# Patient Record
Sex: FEMALE | Race: WHITE | NOT HISPANIC OR LATINO | Employment: UNEMPLOYED | URBAN - METROPOLITAN AREA
[De-identification: names, ages, dates, MRNs, and addresses within clinical notes are randomized per-mention and may not be internally consistent; named-entity substitution may affect disease eponyms.]

---

## 2019-01-28 LAB
HIV 1&2 EXT: NORMAL
HPV ABSTRACT: NORMAL
PAP-ABSTRACT: NORMAL

## 2021-01-05 ENCOUNTER — TRANSFERRED RECORDS (OUTPATIENT)
Dept: MULTI SPECIALTY CLINIC | Facility: CLINIC | Age: 31
End: 2021-01-05

## 2021-01-05 LAB — TSH SERPL-ACNC: 4.55 UIU/ML (ref 0.4–3.99)

## 2021-11-01 ENCOUNTER — OFFICE VISIT (OUTPATIENT)
Dept: FAMILY MEDICINE | Facility: CLINIC | Age: 31
End: 2021-11-01
Payer: COMMERCIAL

## 2021-11-01 VITALS
WEIGHT: 159 LBS | HEIGHT: 66 IN | TEMPERATURE: 98.4 F | OXYGEN SATURATION: 98 % | HEART RATE: 64 BPM | RESPIRATION RATE: 20 BRPM | SYSTOLIC BLOOD PRESSURE: 100 MMHG | DIASTOLIC BLOOD PRESSURE: 68 MMHG | BODY MASS INDEX: 25.55 KG/M2

## 2021-11-01 DIAGNOSIS — Z23 NEED FOR PROPHYLACTIC VACCINATION AND INOCULATION AGAINST INFLUENZA: ICD-10-CM

## 2021-11-01 DIAGNOSIS — E03.9 HYPOTHYROIDISM, UNSPECIFIED TYPE: Primary | ICD-10-CM

## 2021-11-01 PROCEDURE — 99203 OFFICE O/P NEW LOW 30 MIN: CPT | Mod: 25 | Performed by: NURSE PRACTITIONER

## 2021-11-01 PROCEDURE — 84443 ASSAY THYROID STIM HORMONE: CPT | Performed by: NURSE PRACTITIONER

## 2021-11-01 PROCEDURE — 90686 IIV4 VACC NO PRSV 0.5 ML IM: CPT | Performed by: NURSE PRACTITIONER

## 2021-11-01 PROCEDURE — 90471 IMMUNIZATION ADMIN: CPT | Performed by: NURSE PRACTITIONER

## 2021-11-01 PROCEDURE — 36415 COLL VENOUS BLD VENIPUNCTURE: CPT | Performed by: NURSE PRACTITIONER

## 2021-11-01 RX ORDER — LEVOTHYROXINE SODIUM 125 UG/1
62.5 TABLET ORAL
COMMUNITY
Start: 2021-10-04 | End: 2021-12-29

## 2021-11-01 ASSESSMENT — MIFFLIN-ST. JEOR: SCORE: 1435.03

## 2021-11-01 ASSESSMENT — PAIN SCALES - GENERAL: PAINLEVEL: NO PAIN (0)

## 2021-11-01 NOTE — Clinical Note
Please abstract the following data from this visit with this patient into the appropriate field in Epic:    Tests that can be patient reported without a hard copy:    Pap smear done on this date: 1/28/2019 (approximately), by this group: [facility], results were normal, HPV negative--see Care Everywhere for results.

## 2021-11-01 NOTE — PROGRESS NOTES
Assessment & Plan     Hypothyroidism, unspecified type  Lab today for surveillance.  Adjustments to dose per lab results.   - TSH with free T4 reflex; Future             No follow-ups on file.    MICHEAL Melendez CNP [facility] DARIA Silver is a 31 year old who presents for the following health issues     HPI   New patient - est. care  Hypothyroidism Follow-up      Since last visit, patient describes the following symptoms: hair loss, joint pain      How many servings of fruits and vegetables do you eat daily?  2-3    On average, how many sweetened beverages do you drink each day (Examples: soda, juice, sweet tea, etc.  Do NOT count diet or artificially sweetened beverages)?   0    How many days per week do you exercise enough to make your heart beat faster? 3 or less    How many minutes a day do you exercise enough to make your heart beat faster? 9 or less  How many days per week do you miss taking your medication? 1    What makes it hard for you to take your medications?  remembering to take  Diagnosed with hypothyroidism in 5th grade.    As a young adult was on/off medications.   For awhile levels were normal without medication.   While pregnant 2 years ago labs TSH was elevated and medications restarted.   Levels were erratic after having her daughter.   Currently doing a 1/2 of 125 mcg tablet.   Thinking about trying to get pregnant soon.       Please abstract the following data from this visit with this patient into the appropriate field in Epic:    Tests that can be patient reported without a hard copy:    Pap smear done on this date: 1/28/2019 (approximately), by this group: [facility], results were normal, HPV negative--see Care Everywhere for results.       Review of Systems   Constitutional, HEENT, cardiovascular, pulmonary, gi and gu systems are negative, except as otherwise noted.      Objective    /68 (BP Location: Right arm, Patient Position: Sitting, Cuff  "Size: Adult Regular)   Pulse 64   Temp 98.4  F (36.9  C) (Oral)   Resp 20   Ht 1.664 m (5' 5.5\")   Wt 72.1 kg (159 lb)   LMP 10/28/2021 (Exact Date)   SpO2 98%   BMI 26.06 kg/m    Body mass index is 26.06 kg/m .  Physical Exam   GENERAL: healthy, alert and no distress  EYES: Eyes grossly normal to inspection and conjunctivae and sclerae normal  NECK: no adenopathy, no asymmetry, masses, or scars and thyroid normal to palpation  MS: no gross musculoskeletal defects noted  SKIN: no suspicious lesions or rashes  PSYCH: mentation appears normal, affect normal/bright    No results found for this or any previous visit (from the past 24 hour(s)).            "

## 2021-11-02 LAB — TSH SERPL DL<=0.005 MIU/L-ACNC: 3.66 MU/L (ref 0.4–4)

## 2021-11-09 ENCOUNTER — MYC MEDICAL ADVICE (OUTPATIENT)
Dept: FAMILY MEDICINE | Facility: CLINIC | Age: 31
End: 2021-11-09
Payer: COMMERCIAL

## 2021-11-10 ENCOUNTER — MYC MEDICAL ADVICE (OUTPATIENT)
Dept: FAMILY MEDICINE | Facility: CLINIC | Age: 31
End: 2021-11-10
Payer: COMMERCIAL

## 2021-11-10 DIAGNOSIS — E03.9 HYPOTHYROIDISM, UNSPECIFIED TYPE: Primary | ICD-10-CM

## 2021-11-14 ENCOUNTER — HEALTH MAINTENANCE LETTER (OUTPATIENT)
Age: 31
End: 2021-11-14

## 2021-12-29 DIAGNOSIS — E03.9 HYPOTHYROIDISM, UNSPECIFIED TYPE: Primary | ICD-10-CM

## 2021-12-29 NOTE — TELEPHONE ENCOUNTER
Routing refill request to provider for review/approval because:  Medication is reported/historical    Judi Chaudhary RN

## 2021-12-30 RX ORDER — LEVOTHYROXINE SODIUM 125 UG/1
62.5 TABLET ORAL DAILY
Qty: 90 TABLET | Refills: 1 | Status: SHIPPED | OUTPATIENT
Start: 2021-12-30

## 2022-02-01 LAB
HEPATITIS B SURFACE ANTIGEN (EXTERNAL): NEGATIVE
HIV1+2 AB SERPL QL IA: NEGATIVE
RUBELLA ANTIBODY IGG (EXTERNAL): NORMAL

## 2022-06-13 ENCOUNTER — HOSPITAL ENCOUNTER (OUTPATIENT)
Age: 32
End: 2022-06-13
Payer: COMMERCIAL

## 2022-09-07 ENCOUNTER — HOSPITAL ENCOUNTER (INPATIENT)
Facility: CLINIC | Age: 32
LOS: 1 days | Discharge: HOME | End: 2022-09-08
Attending: OBSTETRICS & GYNECOLOGY | Admitting: OBSTETRICS & GYNECOLOGY
Payer: COMMERCIAL

## 2022-09-07 ENCOUNTER — ANESTHESIA (OUTPATIENT)
Dept: OBGYN | Facility: CLINIC | Age: 32
End: 2022-09-07
Payer: COMMERCIAL

## 2022-09-07 ENCOUNTER — ANESTHESIA EVENT (OUTPATIENT)
Dept: OBGYN | Facility: CLINIC | Age: 32
End: 2022-09-07
Payer: COMMERCIAL

## 2022-09-07 PROBLEM — Z34.90 ENCOUNTER FOR INDUCTION OF LABOR: Status: ACTIVE | Noted: 2022-09-07

## 2022-09-07 LAB
ABO/RH(D): NORMAL
ANTIBODY SCREEN: NEGATIVE
HOLD SPECIMEN: NORMAL
SARS-COV-2 RNA RESP QL NAA+PROBE: NEGATIVE
SPECIMEN EXPIRATION DATE: NORMAL

## 2022-09-07 PROCEDURE — 250N000011 HC RX IP 250 OP 636: Performed by: ANESTHESIOLOGY

## 2022-09-07 PROCEDURE — 86901 BLOOD TYPING SEROLOGIC RH(D): CPT | Performed by: OBSTETRICS & GYNECOLOGY

## 2022-09-07 PROCEDURE — 258N000003 HC RX IP 258 OP 636: Performed by: ANESTHESIOLOGY

## 2022-09-07 PROCEDURE — 10907ZC DRAINAGE OF AMNIOTIC FLUID, THERAPEUTIC FROM PRODUCTS OF CONCEPTION, VIA NATURAL OR ARTIFICIAL OPENING: ICD-10-PCS | Performed by: OBSTETRICS & GYNECOLOGY

## 2022-09-07 PROCEDURE — 250N000009 HC RX 250: Performed by: OBSTETRICS & GYNECOLOGY

## 2022-09-07 PROCEDURE — 722N000001 HC LABOR CARE VAGINAL DELIVERY SINGLE

## 2022-09-07 PROCEDURE — U0003 INFECTIOUS AGENT DETECTION BY NUCLEIC ACID (DNA OR RNA); SEVERE ACUTE RESPIRATORY SYNDROME CORONAVIRUS 2 (SARS-COV-2) (CORONAVIRUS DISEASE [COVID-19]), AMPLIFIED PROBE TECHNIQUE, MAKING USE OF HIGH THROUGHPUT TECHNOLOGIES AS DESCRIBED BY CMS-2020-01-R: HCPCS | Performed by: OBSTETRICS & GYNECOLOGY

## 2022-09-07 PROCEDURE — 86850 RBC ANTIBODY SCREEN: CPT | Performed by: OBSTETRICS & GYNECOLOGY

## 2022-09-07 PROCEDURE — 250N000011 HC RX IP 250 OP 636: Performed by: OBSTETRICS & GYNECOLOGY

## 2022-09-07 PROCEDURE — 86780 TREPONEMA PALLIDUM: CPT | Performed by: OBSTETRICS & GYNECOLOGY

## 2022-09-07 PROCEDURE — 258N000003 HC RX IP 258 OP 636: Performed by: OBSTETRICS & GYNECOLOGY

## 2022-09-07 PROCEDURE — 120N000001 HC R&B MED SURG/OB

## 2022-09-07 PROCEDURE — 250N000013 HC RX MED GY IP 250 OP 250 PS 637: Performed by: OBSTETRICS & GYNECOLOGY

## 2022-09-07 PROCEDURE — 3E0R3BZ INTRODUCTION OF ANESTHETIC AGENT INTO SPINAL CANAL, PERCUTANEOUS APPROACH: ICD-10-PCS | Performed by: ANESTHESIOLOGY

## 2022-09-07 PROCEDURE — 00HU33Z INSERTION OF INFUSION DEVICE INTO SPINAL CANAL, PERCUTANEOUS APPROACH: ICD-10-PCS | Performed by: ANESTHESIOLOGY

## 2022-09-07 PROCEDURE — 250N000009 HC RX 250: Performed by: ANESTHESIOLOGY

## 2022-09-07 PROCEDURE — 370N000003 HC ANESTHESIA WARD SERVICE

## 2022-09-07 RX ORDER — NALOXONE HYDROCHLORIDE 0.4 MG/ML
0.2 INJECTION, SOLUTION INTRAMUSCULAR; INTRAVENOUS; SUBCUTANEOUS
Status: DISCONTINUED | OUTPATIENT
Start: 2022-09-07 | End: 2022-09-07 | Stop reason: HOSPADM

## 2022-09-07 RX ORDER — METHYLERGONOVINE MALEATE 0.2 MG/ML
200 INJECTION INTRAVENOUS
Status: DISCONTINUED | OUTPATIENT
Start: 2022-09-07 | End: 2022-09-07 | Stop reason: HOSPADM

## 2022-09-07 RX ORDER — SODIUM CHLORIDE, SODIUM LACTATE, POTASSIUM CHLORIDE, CALCIUM CHLORIDE 600; 310; 30; 20 MG/100ML; MG/100ML; MG/100ML; MG/100ML
INJECTION, SOLUTION INTRAVENOUS CONTINUOUS PRN
Status: DISCONTINUED | OUTPATIENT
Start: 2022-09-07 | End: 2022-09-07 | Stop reason: HOSPADM

## 2022-09-07 RX ORDER — TERBUTALINE SULFATE 1 MG/ML
0.25 INJECTION, SOLUTION SUBCUTANEOUS
Status: DISCONTINUED | OUTPATIENT
Start: 2022-09-07 | End: 2022-09-07

## 2022-09-07 RX ORDER — NALBUPHINE HYDROCHLORIDE 10 MG/ML
2.5-5 INJECTION, SOLUTION INTRAMUSCULAR; INTRAVENOUS; SUBCUTANEOUS EVERY 6 HOURS PRN
Status: DISCONTINUED | OUTPATIENT
Start: 2022-09-07 | End: 2022-09-08 | Stop reason: HOSPADM

## 2022-09-07 RX ORDER — TRANEXAMIC ACID 10 MG/ML
1 INJECTION, SOLUTION INTRAVENOUS EVERY 30 MIN PRN
Status: DISCONTINUED | OUTPATIENT
Start: 2022-09-07 | End: 2022-09-08 | Stop reason: HOSPADM

## 2022-09-07 RX ORDER — NALOXONE HYDROCHLORIDE 0.4 MG/ML
0.4 INJECTION, SOLUTION INTRAMUSCULAR; INTRAVENOUS; SUBCUTANEOUS
Status: DISCONTINUED | OUTPATIENT
Start: 2022-09-07 | End: 2022-09-07 | Stop reason: HOSPADM

## 2022-09-07 RX ORDER — METHYLERGONOVINE MALEATE 0.2 MG/ML
200 INJECTION INTRAVENOUS
Status: DISCONTINUED | OUTPATIENT
Start: 2022-09-07 | End: 2022-09-08 | Stop reason: HOSPADM

## 2022-09-07 RX ORDER — OXYTOCIN 10 [USP'U]/ML
10 INJECTION, SOLUTION INTRAMUSCULAR; INTRAVENOUS
Status: DISCONTINUED | OUTPATIENT
Start: 2022-09-07 | End: 2022-09-08 | Stop reason: HOSPADM

## 2022-09-07 RX ORDER — ACETAMINOPHEN 325 MG/1
650 TABLET ORAL EVERY 4 HOURS PRN
Status: DISCONTINUED | OUTPATIENT
Start: 2022-09-07 | End: 2022-09-08 | Stop reason: HOSPADM

## 2022-09-07 RX ORDER — BISACODYL 10 MG
10 SUPPOSITORY, RECTAL RECTAL DAILY PRN
Status: DISCONTINUED | OUTPATIENT
Start: 2022-09-07 | End: 2022-09-08 | Stop reason: HOSPADM

## 2022-09-07 RX ORDER — OXYTOCIN/0.9 % SODIUM CHLORIDE 30/500 ML
340 PLASTIC BAG, INJECTION (ML) INTRAVENOUS CONTINUOUS PRN
Status: DISCONTINUED | OUTPATIENT
Start: 2022-09-07 | End: 2022-09-07 | Stop reason: HOSPADM

## 2022-09-07 RX ORDER — MISOPROSTOL 200 UG/1
400 TABLET ORAL
Status: DISCONTINUED | OUTPATIENT
Start: 2022-09-07 | End: 2022-09-07 | Stop reason: HOSPADM

## 2022-09-07 RX ORDER — OXYTOCIN/0.9 % SODIUM CHLORIDE 30/500 ML
1-24 PLASTIC BAG, INJECTION (ML) INTRAVENOUS CONTINUOUS
Status: DISCONTINUED | OUTPATIENT
Start: 2022-09-07 | End: 2022-09-07

## 2022-09-07 RX ORDER — CARBOPROST TROMETHAMINE 250 UG/ML
250 INJECTION, SOLUTION INTRAMUSCULAR
Status: DISCONTINUED | OUTPATIENT
Start: 2022-09-07 | End: 2022-09-08 | Stop reason: HOSPADM

## 2022-09-07 RX ORDER — LIDOCAINE HYDROCHLORIDE AND EPINEPHRINE 15; 5 MG/ML; UG/ML
INJECTION, SOLUTION EPIDURAL PRN
Status: DISCONTINUED | OUTPATIENT
Start: 2022-09-07 | End: 2022-09-07

## 2022-09-07 RX ORDER — FENTANYL CITRATE-0.9 % NACL/PF 10 MCG/ML
100 PLASTIC BAG, INJECTION (ML) INTRAVENOUS EVERY 5 MIN PRN
Status: DISCONTINUED | OUTPATIENT
Start: 2022-09-07 | End: 2022-09-07 | Stop reason: HOSPADM

## 2022-09-07 RX ORDER — METOCLOPRAMIDE HYDROCHLORIDE 5 MG/ML
10 INJECTION INTRAMUSCULAR; INTRAVENOUS EVERY 6 HOURS PRN
Status: DISCONTINUED | OUTPATIENT
Start: 2022-09-07 | End: 2022-09-07 | Stop reason: HOSPADM

## 2022-09-07 RX ORDER — ONDANSETRON 2 MG/ML
4 INJECTION INTRAMUSCULAR; INTRAVENOUS EVERY 6 HOURS PRN
Status: DISCONTINUED | OUTPATIENT
Start: 2022-09-07 | End: 2022-09-07 | Stop reason: HOSPADM

## 2022-09-07 RX ORDER — OXYTOCIN/0.9 % SODIUM CHLORIDE 30/500 ML
340 PLASTIC BAG, INJECTION (ML) INTRAVENOUS CONTINUOUS PRN
Status: DISCONTINUED | OUTPATIENT
Start: 2022-09-07 | End: 2022-09-08 | Stop reason: HOSPADM

## 2022-09-07 RX ORDER — CARBOPROST TROMETHAMINE 250 UG/ML
250 INJECTION, SOLUTION INTRAMUSCULAR
Status: DISCONTINUED | OUTPATIENT
Start: 2022-09-07 | End: 2022-09-07 | Stop reason: HOSPADM

## 2022-09-07 RX ORDER — DOCUSATE SODIUM 100 MG/1
100 CAPSULE, LIQUID FILLED ORAL DAILY
Status: DISCONTINUED | OUTPATIENT
Start: 2022-09-07 | End: 2022-09-08 | Stop reason: HOSPADM

## 2022-09-07 RX ORDER — IBUPROFEN 800 MG/1
800 TABLET, FILM COATED ORAL EVERY 6 HOURS PRN
Status: DISCONTINUED | OUTPATIENT
Start: 2022-09-07 | End: 2022-09-08 | Stop reason: HOSPADM

## 2022-09-07 RX ORDER — KETOROLAC TROMETHAMINE 30 MG/ML
30 INJECTION, SOLUTION INTRAMUSCULAR; INTRAVENOUS
Status: DISCONTINUED | OUTPATIENT
Start: 2022-09-07 | End: 2022-09-08 | Stop reason: CLARIF

## 2022-09-07 RX ORDER — MISOPROSTOL 200 UG/1
800 TABLET ORAL
Status: DISCONTINUED | OUTPATIENT
Start: 2022-09-07 | End: 2022-09-07 | Stop reason: HOSPADM

## 2022-09-07 RX ORDER — SODIUM CHLORIDE, SODIUM LACTATE, POTASSIUM CHLORIDE, CALCIUM CHLORIDE 600; 310; 30; 20 MG/100ML; MG/100ML; MG/100ML; MG/100ML
INJECTION, SOLUTION INTRAVENOUS CONTINUOUS PRN
Status: DISCONTINUED | OUTPATIENT
Start: 2022-09-07 | End: 2022-09-07

## 2022-09-07 RX ORDER — OXYTOCIN/0.9 % SODIUM CHLORIDE 30/500 ML
100-340 PLASTIC BAG, INJECTION (ML) INTRAVENOUS CONTINUOUS PRN
Status: DISCONTINUED | OUTPATIENT
Start: 2022-09-07 | End: 2022-09-08 | Stop reason: CLARIF

## 2022-09-07 RX ORDER — ASPIRIN 81 MG/1
TABLET, CHEWABLE ORAL DAILY
Status: ON HOLD | COMMUNITY
End: 2022-09-08

## 2022-09-07 RX ORDER — BUPIVACAINE HYDROCHLORIDE 2.5 MG/ML
INJECTION, SOLUTION EPIDURAL; INFILTRATION; INTRACAUDAL PRN
Status: DISCONTINUED | OUTPATIENT
Start: 2022-09-07 | End: 2022-09-07

## 2022-09-07 RX ORDER — MISOPROSTOL 200 UG/1
800 TABLET ORAL
Status: DISCONTINUED | OUTPATIENT
Start: 2022-09-07 | End: 2022-09-08 | Stop reason: HOSPADM

## 2022-09-07 RX ORDER — OXYTOCIN 10 [USP'U]/ML
10 INJECTION, SOLUTION INTRAMUSCULAR; INTRAVENOUS
Status: DISCONTINUED | OUTPATIENT
Start: 2022-09-07 | End: 2022-09-07 | Stop reason: HOSPADM

## 2022-09-07 RX ORDER — METOCLOPRAMIDE 10 MG/1
10 TABLET ORAL EVERY 6 HOURS PRN
Status: DISCONTINUED | OUTPATIENT
Start: 2022-09-07 | End: 2022-09-07 | Stop reason: HOSPADM

## 2022-09-07 RX ORDER — LIDOCAINE 40 MG/G
CREAM TOPICAL
Status: DISCONTINUED | OUTPATIENT
Start: 2022-09-07 | End: 2022-09-07

## 2022-09-07 RX ORDER — CITRIC ACID/SODIUM CITRATE 334-500MG
30 SOLUTION, ORAL ORAL
Status: DISCONTINUED | OUTPATIENT
Start: 2022-09-07 | End: 2022-09-07 | Stop reason: HOSPADM

## 2022-09-07 RX ORDER — IBUPROFEN 800 MG/1
800 TABLET, FILM COATED ORAL
Status: DISCONTINUED | OUTPATIENT
Start: 2022-09-07 | End: 2022-09-08 | Stop reason: CLARIF

## 2022-09-07 RX ORDER — ONDANSETRON 4 MG/1
4 TABLET, ORALLY DISINTEGRATING ORAL EVERY 6 HOURS PRN
Status: DISCONTINUED | OUTPATIENT
Start: 2022-09-07 | End: 2022-09-07 | Stop reason: HOSPADM

## 2022-09-07 RX ORDER — PROCHLORPERAZINE 25 MG
25 SUPPOSITORY, RECTAL RECTAL EVERY 12 HOURS PRN
Status: DISCONTINUED | OUTPATIENT
Start: 2022-09-07 | End: 2022-09-07 | Stop reason: HOSPADM

## 2022-09-07 RX ORDER — MODIFIED LANOLIN
OINTMENT (GRAM) TOPICAL
Status: DISCONTINUED | OUTPATIENT
Start: 2022-09-07 | End: 2022-09-08 | Stop reason: HOSPADM

## 2022-09-07 RX ORDER — PROCHLORPERAZINE MALEATE 10 MG
10 TABLET ORAL EVERY 6 HOURS PRN
Status: DISCONTINUED | OUTPATIENT
Start: 2022-09-07 | End: 2022-09-07 | Stop reason: HOSPADM

## 2022-09-07 RX ORDER — MISOPROSTOL 200 UG/1
400 TABLET ORAL
Status: DISCONTINUED | OUTPATIENT
Start: 2022-09-07 | End: 2022-09-08 | Stop reason: HOSPADM

## 2022-09-07 RX ORDER — LIDOCAINE 40 MG/G
CREAM TOPICAL
Status: DISCONTINUED | OUTPATIENT
Start: 2022-09-07 | End: 2022-09-07 | Stop reason: HOSPADM

## 2022-09-07 RX ORDER — TRANEXAMIC ACID 10 MG/ML
1 INJECTION, SOLUTION INTRAVENOUS EVERY 30 MIN PRN
Status: DISCONTINUED | OUTPATIENT
Start: 2022-09-07 | End: 2022-09-07 | Stop reason: HOSPADM

## 2022-09-07 RX ORDER — OXYTOCIN 10 [USP'U]/ML
10 INJECTION, SOLUTION INTRAMUSCULAR; INTRAVENOUS
Status: DISCONTINUED | OUTPATIENT
Start: 2022-09-07 | End: 2022-09-08 | Stop reason: CLARIF

## 2022-09-07 RX ORDER — OXYTOCIN/0.9 % SODIUM CHLORIDE 30/500 ML
1-24 PLASTIC BAG, INJECTION (ML) INTRAVENOUS CONTINUOUS
Status: DISCONTINUED | OUTPATIENT
Start: 2022-09-07 | End: 2022-09-07 | Stop reason: HOSPADM

## 2022-09-07 RX ORDER — HYDROCORTISONE 25 MG/G
CREAM TOPICAL 3 TIMES DAILY PRN
Status: DISCONTINUED | OUTPATIENT
Start: 2022-09-07 | End: 2022-09-08 | Stop reason: HOSPADM

## 2022-09-07 RX ADMIN — LIDOCAINE HYDROCHLORIDE,EPINEPHRINE BITARTRATE 2 ML: 15; .005 INJECTION, SOLUTION EPIDURAL; INFILTRATION; INTRACAUDAL; PERINEURAL at 11:10

## 2022-09-07 RX ADMIN — LIDOCAINE HYDROCHLORIDE,EPINEPHRINE BITARTRATE 3 ML: 15; .005 INJECTION, SOLUTION EPIDURAL; INFILTRATION; INTRACAUDAL; PERINEURAL at 11:07

## 2022-09-07 RX ADMIN — SODIUM CHLORIDE, POTASSIUM CHLORIDE, SODIUM LACTATE AND CALCIUM CHLORIDE: 600; 310; 30; 20 INJECTION, SOLUTION INTRAVENOUS at 11:37

## 2022-09-07 RX ADMIN — Medication 1 MILLI-UNITS/MIN: at 09:07

## 2022-09-07 RX ADMIN — ACETAMINOPHEN 650 MG: 325 TABLET, FILM COATED ORAL at 22:23

## 2022-09-07 RX ADMIN — BUPIVACAINE HYDROCHLORIDE 10 ML: 2.5 INJECTION, SOLUTION EPIDURAL; INFILTRATION; INTRACAUDAL at 11:13

## 2022-09-07 RX ADMIN — IBUPROFEN 800 MG: 800 TABLET, FILM COATED ORAL at 19:09

## 2022-09-07 RX ADMIN — SODIUM CHLORIDE, POTASSIUM CHLORIDE, SODIUM LACTATE AND CALCIUM CHLORIDE: 600; 310; 30; 20 INJECTION, SOLUTION INTRAVENOUS at 09:00

## 2022-09-07 RX ADMIN — METHYLERGONOVINE MALEATE 200 MCG: 0.2 INJECTION, SOLUTION INTRAMUSCULAR; INTRAVENOUS at 14:45

## 2022-09-07 RX ADMIN — FENTANYL CITRATE: 0.05 INJECTION, SOLUTION INTRAMUSCULAR; INTRAVENOUS at 11:33

## 2022-09-07 RX ADMIN — DOCUSATE SODIUM 100 MG: 100 CAPSULE, LIQUID FILLED ORAL at 22:25

## 2022-09-07 ASSESSMENT — ACTIVITIES OF DAILY LIVING (ADL)
ADLS_ACUITY_SCORE: 18

## 2022-09-07 NOTE — L&D DELIVERY NOTE
Adrianne Gonzalez is a 32 year old  who was admitted at 40w3d for elective IOL for favorable cervix .  Pregnancy was complicated by hypothyroidism, COVID in preg, O pos, GBS neg.  She underwent labor augmentation with pitocin.  AROM occurred notable for clear fluid. She progressed to fully and began pushing effectively. Pelvis was noted to be adequate.  She delivered a viable male infant with APGARs pending.  Head delivered in an SIVAN position, restituted to ROT and delivered without difficulty.  Nuchal x1 noted, loose; shoulders and body deliverered through.  Left anterior shoulder delivered first, followed by right posterior shoulder without complication, and then rest of body. Spontaneous delivery of a placenta with a 3-V cord ensued shortly thereafter.  Placenta was examined and noted to be intact. She received IV pitocin and methergine as a uterotonic agent.  Brisk bleeding was noted; bimanual exam revealed firm fundus and DAWN; clot from DAWN removed.  Graves spec inserted and cervix examined 2x thoroughly; noted to be intact and bleeding was coming from uterine cavity.  After massage, pit, and methergine, bleeding slowed down significantly. Exam of the perineum revealed no laceration.   cc.      Jaja Jones MD   2022, 3:02 PM     Delivery Summary    Adrianne Gonzalez MRN# [id number]   Age: 32 year old YOB: 1990          Richie Gonzalez-Adrianne [[id number]]    Labor Event Times    Labor onset date: 22 Onset time: 12:00 PM   Dilation complete date: 22 Complete time:  2:13 PM   Start pushing date/time: 2022 1433      Labor Events    Labor Type: Induction/Cervical ripening     Rupture date/time: 22 1240   Rupture type: Artificial Rupture of Membranes  Fluid color: Clear     Induction: Oxytocin  Induction date/time:     Cervical ripening date/time:     Indications for induction: Elective     Augmentation: AROM     Delivery/Placenta Date and Time    Delivery Date: 22  Delivery Time:  2:39 PM   Placenta Date/Time: 9/7/2022  2:43 PM  Oxytocin given at the time of delivery: after delivery of placenta  Delivering clinician: Jaja Jones MD   Other personnel present at delivery:  Provider Role   Gayla Pérez RN Delivery Nurse   Tara Mahoney RN Registered Nurse         Vaginal Counts     Initial count performed by 2 team members:  Two Team Members   Dr. Robert Pérez       Needles Suture Needles Sponges (RETIRED) Instruments   Initial counts 2  5    Added to count   5    Relief counts       Final counts 2  10          Placed during labor Accounted for at the end of labor   FSE NA NA   IUPC NA NA   Cervidil NA NA              Final count performed by 2 team members:  Two Team Members   Dr. Robert Pérez         Apgars    Living status: Living   1 Minute 5 Minute 10 Minute 15 Minute 20 Minute   Skin color:        Heart rate:        Reflex irritability:        Muscle tone:        Respiratory effort:        Total:           Cord    Vessels: 3 Vessels    Cord Complications: Nuchal   Nuchal Intervention: delivered through         Nuchal cord description: loose nuchal cord         Cord Blood Disposition: Lab    Gases Sent?: No    Delayed cord clamping?: Yes    Cord Clamping Delay (seconds):  seconds    Stem cell collection?: No       Labor Events and Shoulder Dystocia    Fetal Tracing Prior to Delivery: Category 1  Shoulder dystocia present?: Neg     Delivery (Maternal) (Provider to Complete) ([id number])    Episiotomy: None  Perineal lacerations: None    Repair suture: None  Genital tract inspection done: Pos     Blood Loss  Mother: Adrianne Gonzalez #[id number]   Start of Mother's Information    Delivery Blood Loss  09/07/22 1200 - 09/07/22 1502    Delivery QBL (mL) Hospital Encounter 400 mL    Total  400 mL         End of Mother's Information  Mother: Adrianne Gonzalez #[id number]          Delivery - Provider to Complete ([id number])    Delivering clinician: Robert  MD Jaja  Attempted Delivery Types (Choose all that apply): Spontaneous Vaginal Delivery  Delivery Type (Choose the 1 that will go to the Birth History): Vaginal, Spontaneous                   Other personnel:  Provider Role   Gayla Pérez, RN Delivery Nurse   Tara Mahoney, RN Registered Nurse                Placenta    Date/Time: 9/7/2022  2:43 PM  Removal: Spontaneous  Disposition: Hospital disposal           Anesthesia    Method: Epidural                Presentation and Position    Presentation: Vertex    Position: Right Occiput Anterior                 Jaja Jones MD

## 2022-09-07 NOTE — PROVIDER NOTIFICATION
, 40 3/7 weeks gestation. Here for induction. Monitors explained and applied. History and prenatal reviewed. 0825- SVE . Dr. Jones updated, will place orders for Pitocin induction.

## 2022-09-07 NOTE — PROVIDER NOTIFICATION
09/07/22 0956   Provider Notification   Provider Name/Title Dr. Jones   Method of Notification At Bedside   Request Evaluate in Person   Notification Reason Status Update   Plan per provider, increase Pitocin by 2 anibal units as needed and obtain epidural.

## 2022-09-07 NOTE — ANESTHESIA PREPROCEDURE EVALUATION
Anesthesia Pre-Procedure Evaluation    Patient: Adrianne Gonzalez   MRN: [id number] : 1990        Procedure : * No procedures listed *          Past Medical History:   Diagnosis Date     Pyelonephritis, unspecified 2005     Unspecified hypothyroidism       History reviewed. No pertinent surgical history.   Allergies   Allergen Reactions     No Known Drug Allergies       Social History     Tobacco Use     Smoking status: Former Smoker     Smokeless tobacco: Never Used     Tobacco comment: Mom smokes in the home.   Substance Use Topics     Alcohol use: Yes     Comment: socially      Wt Readings from Last 1 Encounters:   22 78.9 kg (174 lb)        Anesthesia Evaluation   Pt has had prior anesthetic.     No history of anesthetic complications       ROS/MED HX  ENT/Pulmonary:  - neg pulmonary ROS     Neurologic:  - neg neurologic ROS     Cardiovascular:  - neg cardiovascular ROS     METS/Exercise Tolerance:     Hematologic:  - neg hematologic  ROS     Musculoskeletal:  - neg musculoskeletal ROS     GI/Hepatic:  - neg GI/hepatic ROS     Renal/Genitourinary:  - neg Renal ROS     Endo:     (+) thyroid problem, hypothyroidism,     Psychiatric/Substance Use:  - neg psychiatric ROS     Infectious Disease:  - neg infectious disease ROS     Malignancy:       Other:            Physical Exam    Airway        Mallampati: II   TM distance: > 3 FB   Neck ROM: full   Mouth opening: > 3 cm    Respiratory Devices and Support         Dental  no notable dental history         Cardiovascular   cardiovascular exam normal          Pulmonary   pulmonary exam normal                OUTSIDE LABS:  CBC:   Lab Results   Component Value Date    WBC 7.8 2005    HGB 13.1 2005    HGB 13.7 2005    HCT 37.3 2005     2005     BMP:   Lab Results   Component Value Date    GLC 84 2005     COAGS: No results found for: PTT, INR, FIBR  POC:   Lab Results   Component Value Date    HCG Negative 2006      HEPATIC:   Lab Results   Component Value Date    ALBUMIN 4.7 (H) 06/14/2005    PROTTOTAL 7.9 06/14/2005    ALT 15 06/14/2005    AST 19 06/14/2005    ALKPHOS 71 06/14/2005    BILITOTAL 0.8 06/14/2005     OTHER:   Lab Results   Component Value Date    LIPASE 42 06/14/2005    TSH 3.66 11/01/2021    T4 1.25 03/16/2005    SED 16 06/14/2005       Anesthesia Plan    ASA Status:  2   - Procedure: Procedure only, no anesthetic delivered      Anesthesia Type: Epidural.              Consents    Anesthesia Plan(s) and associated risks, benefits, and realistic alternatives discussed. Questions answered and patient/representative(s) expressed understanding.    - Discussed:     - Discussed with:  Patient         Postoperative Care            Comments:    Other Comments: Continuous Labor Epidural: Indication is for labor pain. Following an discussion of the procedure, epidural expectations, and risks and benefits (risks including, but not limited to, nerve damage, infection, bleeding/hematoma, inadvertent dural puncture, spinal headache, partial or failed block), the patient appears to understand and consents to proceed. Questions were encouraged and answered.             Tanner Li MD

## 2022-09-07 NOTE — ANESTHESIA PROCEDURE NOTES
Epidural catheter Procedure Note    Pre-Procedure   Staff -        Anesthesiologist:  Tanner Li MD       Performed By: anesthesiologist       Referred By: [place]       Location: OB       Pre-Anesthestic Checklist: patient identified, IV checked, risks and benefits discussed, informed consent, monitors and equipment checked, pre-op evaluation and at physician/surgeon's request  Timeout:       Correct Patient: Yes        Correct Procedure: Yes        Correct Site: Yes        Correct Position: Yes   Procedure Documentation  Procedure: epidural catheter       Diagnosis: labor       Patient Position: sitting       Patient Prep/Sterile Barriers: sterile gloves, mask, patient draped       Skin prep: Betadine       Local skin infiltrated with 3 mL of 1% lidocaine.        Insertion Site: L3-4. (midline approach).       Technique: LORT saline        AMBREEN at 5.5 cm.       Needle Type: Orgenesisy needle       Needle Gauge: 17.        Needle Length (Inches): 3.5        Catheter: 19 G.          Catheter threaded easily.           Threaded 11 cm at skin.         # of attempts: 1 and  # of redirects:  0    Assessment/Narrative         Paresthesias: No.       Test dose of 3 mL lidocaine 1.5% w/ 1:200,000 epinephrine at.         Test dose negative, 3 minutes after injection, for signs of intravascular, subdural, or intrathecal injection.       Insertion/Infusion Method: LORT saline       Aspiration negative for Heme or CSF via Epidural Catheter.     Comments:  Procedure tolerated well without apparent complications. Initial MDA bolus of 0.25% bupivacaine was incrementally given without difficulty or complications. Epidural infusion (0.125% bupi with fentanyl 2mcg/ml) started at 10 ml/hr with PCEA of 5 ml q15min with a max cumulative dose of 25 ml/hr. PCEA instructions given and use encouraged PRN. Epidural expectations again reviewed and questions answered. Patient hemodynamically stable.  Patient and epidural functionality to  be reassessed later via vital sign flowsheet, nursing communication, and/or patient report.  Anesthesiologist immediately available for ongoing assessment and management.

## 2022-09-07 NOTE — PLAN OF CARE
Data: Adrianne Gonzalez, up to BR with 1 assist. Tolerated activity OK, transferred to 450 via wheelchair at 1715. Baby transferred via parent's arms.  Action: Receiving unit notified of transfer: Yes. Patient and family notified of room change. Report given to Kimberlee ÁLVAREZ at 1720. Belongings sent to receiving unit. Accompanied by Registered Nurse. Oriented patient to surroundings. Call light within reach. ID bands double-checked with receiving RN.  Response: Patient tolerated transfer and is stable.

## 2022-09-07 NOTE — H&P
Labor & Delivery History & Physical  Patient Name:  Adrianne Gonzalez   MRN:  [id number]  Age:  32 year old    YOB: 1990      Subjective:    Adrianne Gonzalez is a 32 year old  female at 40+3wga who presents for scheduled IOL for elective IOL.  Patient reports mild and irregular contractions.    SVE in office yesterday was /-1    Patient denies leaking of fluid or vaginal bleeding.  Fetal Movement: present.       Prenatal care complicated by:  - Hypothyroidism: TSH normal on .  Taking Levothyorxine 125mg 0.5tab 3d/wk, and 1tab 4d/week  - COVID in preg: on 22, EFW 80% AC 95%  - O pos  - GBS neg      Pregnancy Episode Problems (from 22 to present)     No problems associated with this episode.        OB History    Para Term  AB Living   1 0 0 0 0 0   SAB IAB Ectopic Multiple Live Births   0 0 0 0 0      # Outcome Date GA Lbr Ajrrod/2nd Weight Sex Delivery Anes PTL Lv   1 Current              Past Medical History:   Diagnosis Date     Pyelonephritis, unspecified 2005     Unspecified hypothyroidism      No past surgical history on file.  Social History     Tobacco Use     Smoking status: Former Smoker     Smokeless tobacco: Never Used     Tobacco comment: Mom smokes in the home.   Vaping Use     Vaping Use: Never used   Substance Use Topics     Alcohol use: Yes     Comment: socially     Drug use: Never      History   Drug Use Unknown     Family History   Problem Relation Age of Onset     Allergies Mother      Thyroid Disease Mother      Allergies Brother      Lipids Maternal Grandmother      Cancer Maternal Grandmother         lung     Diabetes Brother      C.A.D. Maternal Grandfather         in 60's     [unfilled]   Medications Prior to Admission   Medication Sig Dispense Refill Last Dose     levothyroxine (SYNTHROID/LEVOTHROID) 125 MCG tablet Take 0.5 tablets (62.5 mcg) by mouth daily 90 tablet 1      Most Recent Immunizations   Administered Date(s) Administered     DTAP  (<7y) 1992     HPV Quadrivalent 2008     HepB 2001     HepB, Unspecified 2001     Hib (PRP-T) 1991     Historical DTP/aP 1992     Influenza Vaccine IM > 6 months Valent IIV4 (Alfuria,Fluzone) 2021     MMR 1993     Meningococcal (Menactra ) 2008     OPV, trivalent, live 1992     Pedvax-hib 1991     Poliovirus, inactivated (IPV) 1992     Tdap (Adacel,Boostrix) 2019       Review of Systems - NEGATIVE FOR  Fevers  Chills  Headache  Visual changes  Ear pain  Sore throat  Cough  Shortness of breath  Chest pain  Palpitations  Constipation  Diarrhea  Vomiting  Bloody stools  Hematuria  Dysuria  Muscle weakness  Gait disturbance    Objective:     0 lbs 0 oz      General: General appearance: alert, well appearing, and in no distress.   Lungs:   unlabored   Heart:  regular rate and rhythm   Abdomen: Gravid, nontender    [place]: Contraction Frequency (min):  irregular  Contraction Duration (sec):     FHT: Baseline 140 BPM   Fetal heart variability:moderate  Fetal heart rate accelerations:  Present 15 x 15  Fetal heart rate decelerations: none  Fetal heart rate interpretation:  Category I   Speculum:    Cervix: Dilation:   5  Effacement:     80  Station:            -1    Extremities: Trace to 1+ edema bilateral, symmetric edema; neg Desiree's sign      Lab Review:    Hemoglobin   Date Value Ref Range Status   2005 13.1 11.7 - 15.7 g/dL Final   2005 13.7 11.7 - 15.7 g/dL Final     Hematocrit   Date Value Ref Range Status   2005 37.3 35.0 - 47.0 % Final     TSH   Date Value Ref Range Status   2021 3.66 0.40 - 4.00 mU/L Final   2005 1.35 0.4 - 5.0 mU/L Final           Assessment  Adrianne Gonzalez is a 32 year old  at 40+3 wga  who presents for elective IOL for favorable cervix        Plan  - IOL: will start pitocin, and AROM with next exam.  Epidural upon request.  - FHT: cat I.  Cont CEFM.  - Hypothyroidism: TSH normal on  . Cont Levothyorxine 125mg 0.5 tab 3d/wk, and 1 tab 4d/week  - COVID in preg: on 22, EFW 80% AC 95%  - O pos  - GBS neg  - h/o  x1, pelvis proven to 7# 3oz  - Dispo: anticipate

## 2022-09-07 NOTE — PROGRESS NOTES
Patient comfortable with epidural.  States she does not really feel contractions.    FHT: 135/mod/+accel/-decel  [place]: q 3 min  SVE: 6/70/-1  AROM'ed with clear fluid. Head well applied to cervix with no cord prolapse.      Assessment  Adrianne Gonzalez is a 32 year old  at 40+3 wga  who presents for elective IOL for favorable cervix     Plan  - IOL: cont pitocin, s/p uncomplicated AROM  - FHT: cat I.  Cont CEFM.  - Hypothyroidism: TSH normal on . Cont Levothyorxine 125mg 0.5 tab 3d/wk, and 1 tab 4d/week  - COVID in preg: on 22, EFW 80% AC 95%  - O pos  - GBS neg  - h/o  x1, pelvis proven to 7# 3oz  - Dispo: anticipate    patient

## 2022-09-08 VITALS
HEIGHT: 65 IN | DIASTOLIC BLOOD PRESSURE: 66 MMHG | WEIGHT: 174 LBS | RESPIRATION RATE: 16 BRPM | SYSTOLIC BLOOD PRESSURE: 104 MMHG | HEART RATE: 77 BPM | TEMPERATURE: 98.1 F | OXYGEN SATURATION: 86 % | BODY MASS INDEX: 28.99 KG/M2

## 2022-09-08 LAB
HGB BLD-MCNC: 9.7 G/DL (ref 11.7–15.7)
T PALLIDUM AB SER QL: NONREACTIVE

## 2022-09-08 PROCEDURE — 85018 HEMOGLOBIN: CPT | Performed by: OBSTETRICS & GYNECOLOGY

## 2022-09-08 PROCEDURE — 36415 COLL VENOUS BLD VENIPUNCTURE: CPT | Performed by: OBSTETRICS & GYNECOLOGY

## 2022-09-08 PROCEDURE — 250N000013 HC RX MED GY IP 250 OP 250 PS 637: Performed by: OBSTETRICS & GYNECOLOGY

## 2022-09-08 RX ORDER — MODIFIED LANOLIN
OINTMENT (GRAM) TOPICAL
Qty: 7 G | Refills: 3 | Status: SHIPPED | OUTPATIENT
Start: 2022-09-08

## 2022-09-08 RX ORDER — IBUPROFEN 800 MG/1
800 TABLET, FILM COATED ORAL EVERY 6 HOURS PRN
Qty: 40 TABLET | Refills: 1 | Status: SHIPPED | OUTPATIENT
Start: 2022-09-08

## 2022-09-08 RX ORDER — NALOXONE HYDROCHLORIDE 0.4 MG/ML
0.2 INJECTION, SOLUTION INTRAMUSCULAR; INTRAVENOUS; SUBCUTANEOUS
Status: DISCONTINUED | OUTPATIENT
Start: 2022-09-08 | End: 2022-09-08 | Stop reason: HOSPADM

## 2022-09-08 RX ORDER — DOCUSATE SODIUM 100 MG/1
100 CAPSULE, LIQUID FILLED ORAL DAILY
Qty: 30 CAPSULE | Refills: 0 | Status: SHIPPED | OUTPATIENT
Start: 2022-09-09

## 2022-09-08 RX ORDER — HYDROCORTISONE 25 MG/G
CREAM TOPICAL 3 TIMES DAILY PRN
Qty: 30 G | Refills: 0 | Status: SHIPPED | OUTPATIENT
Start: 2022-09-08

## 2022-09-08 RX ORDER — NALOXONE HYDROCHLORIDE 0.4 MG/ML
0.4 INJECTION, SOLUTION INTRAMUSCULAR; INTRAVENOUS; SUBCUTANEOUS
Status: DISCONTINUED | OUTPATIENT
Start: 2022-09-08 | End: 2022-09-08 | Stop reason: HOSPADM

## 2022-09-08 RX ADMIN — DOCUSATE SODIUM 100 MG: 100 CAPSULE, LIQUID FILLED ORAL at 08:03

## 2022-09-08 RX ADMIN — LEVOTHYROXINE SODIUM 62.5 MCG: 125 TABLET ORAL at 08:03

## 2022-09-08 RX ADMIN — ACETAMINOPHEN 650 MG: 325 TABLET, FILM COATED ORAL at 08:03

## 2022-09-08 RX ADMIN — IBUPROFEN 800 MG: 800 TABLET, FILM COATED ORAL at 01:49

## 2022-09-08 ASSESSMENT — ACTIVITIES OF DAILY LIVING (ADL)
ADLS_ACUITY_SCORE: 18

## 2022-09-08 NOTE — PLAN OF CARE
Data: Vital signs within normal limits. Postpartum checks within normal limits - see flow record. Patient eating and drinking normally. Patient able to empty bladder independently and is up ambulating. No apparent signs of infection.  Patient performing self cares and is able to care for infant.  Action: Patient medicated during the shift for pain and cramping. See MAR. Patient reassessed within 1 hour after each medication and pain was improved - patient stated she was comfortable. Patient education done about  See flow record.  Response: Positive attachment behaviors observed with infant. Support persons present.   Plan: Anticipate discharge possibly later today if baby cleared for discharge EPDS score 0 . BC completed

## 2022-09-08 NOTE — PLAN OF CARE
Patient transferred to  @ 1730. Pitocin infusing at admission per orders. Patient educated on unit routines, PP cares and infant safety.     VSS on room air. Fundus is firm and light to moderate rubra with no clots noted. Tolerating regular diet. Voiding without difficulty. Breastfeeding with minimal assistance. SO at bedside and attentive to patients needs. Up independently.

## 2022-09-08 NOTE — DISCHARGE SUMMARY
[facility] Discharge Summary    Adrianne Gonzalez MRN# [id number]   Age: 32 year old YOB: 1990     Date of Admission:  2022  Date of Discharge::  2022  Admitting Physician:  Jaja Jones MD  Discharge Physician:  Alma Ried MD            Admission Diagnoses:   -IUP at 40w3d  -Hypothyroidism          Discharge Diagnosis:     -IUP at 40w3d, now delivered  ABLA, stable, asymptomatic  PPH          Procedures:     Procedure(s): -            Medications Prior to Admission:     Medications Prior to Admission   Medication Sig Dispense Refill Last Dose     aspirin (ASA) 81 MG chewable tablet Take by mouth daily        levothyroxine (SYNTHROID/LEVOTHROID) 125 MCG tablet Take 0.5 tablets (62.5 mcg) by mouth daily 90 tablet 1 2022 at Unknown time     Prenatal Vit-Fe Fumarate-FA (PRENATAL VITAMIN PO)                 Discharge Medications:     Current Discharge Medication List      CONTINUE these medications which have NOT CHANGED    Details   aspirin (ASA) 81 MG chewable tablet Take by mouth daily      levothyroxine (SYNTHROID/LEVOTHROID) 125 MCG tablet Take 0.5 tablets (62.5 mcg) by mouth daily  Qty: 90 tablet, Refills: 1    Associated Diagnoses: Hypothyroidism, unspecified type      Prenatal Vit-Fe Fumarate-FA (PRENATAL VITAMIN PO)                   Brief Admission History   Adrianne Gonzalez is a 32 year old  female at 40+3wga who presents for scheduled IOL for elective IOL.  Patient reports mild and irregular contractions.    SVE in office yesterday was /-1     Patient denies leaking of fluid or vaginal bleeding.  Fetal Movement: present.     Assessment  Adrianne Gonzalez is a 32 year old  at 40+3 wga  who presents for elective IOL for favorable cervix           Plan  - IOL: will start pitocin, and AROM with next exam.  Epidural upon request.  - FHT: cat I.  Cont CEFM.  - Hypothyroidism: TSH normal on . Cont Levothyorxine 125mg 0.5 tab 3d/wk, and 1 tab 4d/week  - COVID  in preg: on 22, EFW 80% AC 95%  - O pos  - GBS neg  - h/o  x1, pelvis proven to 7# 3oz  - Dispo: anticipate          Brief Intrapartum Course:   Adrianne Gonzalez is a 32 year old  who was admitted at 40w3d for elective IOL for favorable cervix .  Pregnancy was complicated by hypothyroidism, COVID in preg, O pos, GBS neg.  She underwent labor augmentation with pitocin.  AROM occurred notable for clear fluid. She progressed to fully and began pushing effectively. Pelvis was noted to be adequate.  She delivered a viable male infant with APGARs pending.  Head delivered in an SIVAN position, restituted to ROT and delivered without difficulty.  Nuchal x1 noted, loose; shoulders and body deliverered through.  Left anterior shoulder delivered first, followed by right posterior shoulder without complication, and then rest of body. Spontaneous delivery of a placenta with a 3-V cord ensued shortly thereafter.  Placenta was examined and noted to be intact. She received IV pitocin and methergine as a uterotonic agent.  Brisk bleeding was noted; bimanual exam revealed firm fundus and DAWN; clot from DAWN removed.  Graves spec inserted and cervix examined 2x thoroughly; noted to be intact and bleeding was coming from uterine cavity.  After massage, pit, and methergine, bleeding slowed down significantly. Exam of the perineum revealed no laceration.   cc.             Hospital Course:   The patient's hospital course was unremarkable.  On discharge, her pain was well controlled. Vaginal bleeding is similar to peak menstrual flow.  Voiding without difficulty.  Ambulating well and tolerating a normal diet.  No fever.  Breastfeeding well.  Infant is stable. She was discharged on post-partum day #1.    Post-partum hemoglobin:   Hemoglobin   Date Value Ref Range Status   2005 13.1 11.7 - 15.7 g/dL Final             Discharge Instructions and Follow-Up:     Discharge diet: Regular   Discharge activity: Pelvic rest  for 6 weeks including no sexual intercourse, tampons, or douching.   Discharge follow-up: Follow up with your primary OB for a routine postpartum visit in 6 weeks           Discharge Disposition:     Discharged to home      Alma Reid MD on 9/8/2022 at 9:17 AM

## 2022-09-08 NOTE — PLAN OF CARE
Pt stable for discharge to home. Discharge education and follow up reviewed with patient; questions/concerns addressed. ID bands matched with infant.  Pt ambulated off unit at time of discharge, escorted with family off unit by unit staff member.  Discharge to home.

## 2022-09-08 NOTE — ANESTHESIA POSTPROCEDURE EVALUATION
Patient: Adrianne Gonzalez    Procedure: * No procedures listed *       Anesthesia Type:  Epidural    Note:  Disposition: Inpatient   Postop Pain Control:    PONV:    Neuro/Psych:    Airway/Respiratory:    CV/Hemodynamics:    Other NRE:    DID A NON-ROUTINE EVENT OCCUR? No    Event details/Postop Comments:      S/P epidural for labor.   I or my partner was immediately available for management of this patient during epidural analgesia infusion.  VSS.  Doing well. Block resolved.  Neuro at baseline. Denies positional headache. Minimal side effects easily managed w/ PRN meds. No apparent anesthetic complications. No follow-up required.    Goldie Drummond MD             Last vitals:  Vitals:    09/07/22 1658 09/07/22 1900 09/08/22 0144   BP: 117/67 112/67 106/61   Pulse:  79    Resp:  16 16   Temp:  98.3  F (36.8  C) 98.3  F (36.8  C)   SpO2:          Electronically Signed By: Goldie Drummond MD  September 8, 2022  7:50 AM

## 2022-09-08 NOTE — PLAN OF CARE
Vitals stable. Independent with self and infant cares. Breastfeeding with no assistance. Bonding well with baby.

## 2022-09-08 NOTE — PROGRESS NOTES
"[facility] Postpartum Note    S:  Adrianne Gonzalez feels well this morning. Was able to sleep last night. Pain control adequate. Lochia minimal. Voiding. Breast feeding. Mood Good.     O:  Vitals were reviewed  Blood pressure 106/61, pulse 79, temperature 98.3  F (36.8  C), temperature source Oral, resp. rate 16, height 1.651 m (5' 5\"), weight 78.9 kg (174 lb), last menstrual period 10/28/2021, SpO2 (!) 86 %, unknown if currently breastfeeding.      General: healthy, alert and no distress  Abd: soft, appropriately tender, fundus firm  Legs: Non-tender, 1+ pitting edema    No results found for: RH  Rubella: imune    Assessment and Plan:   Postpartum Day #1, status post vaginal delivery, doing well.  -- Routine care  -- F/U 6 weeks w/ Primary OB  -- Discharge meds: see med rec  -- Contraception: considering    ABLA  -delivery EBL of 400mL  -stable, asymptomatic at 9.7    Hypothyroidism  -continue usual regimen of levothyroxine    Covid in pregnancy  -not SGA    Alma Reid MD    "

## 2022-11-21 ENCOUNTER — HEALTH MAINTENANCE LETTER (OUTPATIENT)
Age: 32
End: 2022-11-21

## 2023-07-11 ENCOUNTER — HOSPITAL ENCOUNTER (EMERGENCY)
Facility: CLINIC | Age: 33
Discharge: HOME | End: 2023-07-11
Attending: EMERGENCY MEDICINE | Admitting: EMERGENCY MEDICINE
Payer: COMMERCIAL

## 2023-07-11 VITALS
SYSTOLIC BLOOD PRESSURE: 114 MMHG | OXYGEN SATURATION: 100 % | TEMPERATURE: 97.7 F | RESPIRATION RATE: 18 BRPM | DIASTOLIC BLOOD PRESSURE: 78 MMHG | HEART RATE: 74 BPM

## 2023-07-11 DIAGNOSIS — R30.0 DYSURIA: ICD-10-CM

## 2023-07-11 DIAGNOSIS — N39.0 URINARY TRACT INFECTION WITHOUT HEMATURIA, SITE UNSPECIFIED: ICD-10-CM

## 2023-07-11 LAB
ALBUMIN UR-MCNC: NEGATIVE MG/DL
APPEARANCE UR: CLEAR
BACTERIA #/AREA URNS HPF: ABNORMAL /HPF
BILIRUB UR QL STRIP: NEGATIVE
COLOR UR AUTO: ABNORMAL
GLUCOSE UR STRIP-MCNC: NEGATIVE MG/DL
HGB UR QL STRIP: NEGATIVE
KETONES UR STRIP-MCNC: NEGATIVE MG/DL
LEUKOCYTE ESTERASE UR QL STRIP: NEGATIVE
NITRATE UR QL: NEGATIVE
PH UR STRIP: 5.5 [PH] (ref 5–7)
RBC URINE: <1 /HPF
SP GR UR STRIP: 1.01 (ref 1–1.03)
SQUAMOUS EPITHELIAL: 1 /HPF
UROBILINOGEN UR STRIP-MCNC: NORMAL MG/DL
WBC URINE: 1 /HPF

## 2023-07-11 PROCEDURE — 250N000013 HC RX MED GY IP 250 OP 250 PS 637: Performed by: EMERGENCY MEDICINE

## 2023-07-11 PROCEDURE — 81001 URINALYSIS AUTO W/SCOPE: CPT | Performed by: EMERGENCY MEDICINE

## 2023-07-11 PROCEDURE — 87086 URINE CULTURE/COLONY COUNT: CPT | Performed by: EMERGENCY MEDICINE

## 2023-07-11 PROCEDURE — 99283 EMERGENCY DEPT VISIT LOW MDM: CPT

## 2023-07-11 RX ORDER — CEPHALEXIN 500 MG/1
500 CAPSULE ORAL 2 TIMES DAILY
Qty: 10 CAPSULE | Refills: 0 | Status: SHIPPED | OUTPATIENT
Start: 2023-07-11 | End: 2023-07-16

## 2023-07-11 RX ORDER — CEPHALEXIN 500 MG/1
500 CAPSULE ORAL ONCE
Status: COMPLETED | OUTPATIENT
Start: 2023-07-11 | End: 2023-07-11

## 2023-07-11 RX ADMIN — CEPHALEXIN 500 MG: 500 CAPSULE ORAL at 22:13

## 2023-07-12 NOTE — ED TRIAGE NOTES
Pt presents to the ED with flank pain starting a few days ago. Pts pain 1/10, but she has a hx of kidney infection.      Triage Assessment     Row Name 07/11/23 1952       Triage Assessment (Adult)    Airway WDL WDL       Respiratory WDL    Respiratory WDL WDL       Skin Circulation/Temperature WDL    Skin Circulation/Temperature WDL WDL       Cardiac WDL    Cardiac WDL WDL       Peripheral/Neurovascular WDL    Peripheral Neurovascular WDL WDL       Cognitive/Neuro/Behavioral WDL    Cognitive/Neuro/Behavioral WDL WDL

## 2023-07-12 NOTE — ED PROVIDER NOTES
History     Chief Complaint:  Flank Pain     The history is provided by the patient.      Adrianne Gonzalez is a 33 year old female with a history of pyelonephritis and Hashimoto's thyroiditis who presents with flank pain. She suspects having an kidney infection and is feeling lower bilateral flank pain. The pain started two days ago but noted that it was minimal and mentioned that it was similar to pain she had years ago. She also endorses left lower quadrant abdominal pain. She noticed her urine being darker and emitting a foul odor but after drinking more water, this has resolved. She denies fever, chills, nausea, vomiting, chest pain, or any surgeries to her abdomen. Patient reportedly has no known drug allergies.     Independent Historian:   None - Patient Only    Review of External Notes:       Medications:    Levothyroxine     Past Medical History:    Pyelonephritis, unspecified  Hashimoto's thyroiditis  Tobacco use disorder   Migraines     Past Surgical History:    Create eardrum opening  Lower lip mass excision   [place] tooth extraction     Physical Exam     Patient Vitals for the past 24 hrs:   BP Temp Temp src Pulse Resp SpO2   07/11/23 1952 114/78 97.7  F (36.5  C) Temporal 74 18 100 %      Physical Exam  Constitutional: Well appearing.  HEENT: Atraumatic. Moist mucous membranes.  Cardiac: Regular rate and rhythm.  No murmur or rub.  Respiratory: Clear to auscultation bilaterally.  No respiratory distress.    Abdomen: Soft and nontender.  No rebound or guarding.  No CVA tenderness to palpation.  Nondistended.  Musculoskeletal: No edema.  Normal range of motion.  Neurologic: Alert and oriented x3.  Normal tone and bulk. Normal gait.  Skin: No rashes.  No edema.  Psych: Normal affect.  Normal behavior.        Emergency Department Course     Laboratory:  Labs Ordered and Resulted from Time of ED Arrival to Time of ED Departure   ROUTINE UA WITH MICROSCOPIC - Abnormal       Result Value    Color Urine Straw       Appearance Urine Clear      Glucose Urine Negative      Bilirubin Urine Negative      Ketones Urine Negative      Specific Gravity Urine 1.007      Blood Urine Negative      pH Urine 5.5      Protein Albumin Urine Negative      Urobilinogen Urine Normal      Nitrite Urine Negative      Leukocyte Esterase Urine Negative      Bacteria Urine Few (*)     RBC Urine <1      WBC Urine 1      Squamous Epithelials Urine 1        Emergency Department Course & Assessments:     Interventions:  Medications   cephALEXin (KEFLEX) capsule 500 mg (500 mg Oral $Given 7/11/23 2213)      Independent Interpretation (X-rays, CTs, rhythm strip):  None    Assessments/Consultations/Discussion of Management or Tests:   ED Course as of 07/12/23 0233   Tue Jul 11, 2023 2200 I briefly obtained history and examined the patient in triage.      Social Determinants of Health affecting care:   None    Disposition:  The patient was discharged to home.     Impression & Plan      Medical Decision Making:  Adrianne Gonzalez is a 33-year-old woman who is afebrile and hemodynamically stable.  She has no CVA tenderness or no tenderness on abdominal exam.  She has no fever or systemic signs of infection.  UA is noted as above with just a few bacteria but no other components of infection.  She states her urine is quite cloudy last few days.  We discussed potential kidney stone and offered a CT scan of the abdomen and pelvis to rule out a kidney stone, however, she feels most comfortable treating for UTI at this time.  She feels is consistent with previous UTIs.  Given few bacteria we will send a urine culture will start antibiotics but also discussed that I cannot rule out other etiologies such as kidney stone.  He is understanding and will return if anything worsens.  She does not want any CT scan or blood work at this time.  She is very well and nontoxic-appearing if this is a UTI she is safe for discharge home.  She has no abdominal tenderness and is  taking p.o. without difficulty.  Discussed plan for discharge home with Keflex and I welcomed her to return at any time to change her mind about the CT scan to rule out a kidney stone or other etiology and she is in agreement and understanding.  Her questions were answered.  Strict return precautions were given.  She was in no distress at time of discharge.    Diagnosis:    ICD-10-CM    1. Dysuria  R30.0       2. Urinary tract infection without hematuria, site unspecified  N39.0          Discharge Medications:  Discharge Medication List as of 7/11/2023 10:08 PM      START taking these medications    Details   cephALEXin (KEFLEX) 500 MG capsule Take 1 capsule (500 mg) by mouth 2 times daily for 5 days, Disp-10 capsule, R-0, E-Prescribe              Scribe Disclosure:  I, Gina Gonzalez, am serving as a scribe at 2:38 AM on 7/12/2023 to document services personally performed by Jasbir Wilkerson MD based on my observations and the provider's statements to me.  7/11/2023   Jasbir Wilkerson MD Salay, Nicholas J, MD  07/12/23 0428

## 2023-07-13 LAB — BACTERIA UR CULT: NO GROWTH

## 2023-07-15 ENCOUNTER — TELEPHONE (OUTPATIENT)
Dept: EMERGENCY MEDICINE | Facility: CLINIC | Age: 33
End: 2023-07-15
Payer: COMMERCIAL

## 2023-07-15 NOTE — TELEPHONE ENCOUNTER
"[facility] Emergency Department/Urgent Care Lab result notification:    Reason for call    Notify the patient/parent of lab results    Assess patient symptoms (if applicable)    Review ED providers recommendations/discharge instructions (if necessary)    Advise per [facility] ED lab result protocol    Lab result  Final urine culture report shows \"NO GROWTH\" and is NEGATIVE.  [facility] Emergency Dept discharge antibiotic: Cephalexin (Keflex) 500 mg capsule, 1 capsule (500 mg) by mouth 2 times daily for 5 days.  [facility] Emergency Dept discharge Rx antibiotic for UTI only (Yes/No): YES  RN to confirm if Patient took antibiotic within 3 days prior to urine culture collection.  Recommendations in treatment per [facility] ED Lab result Urine culture protocol.  4:54p  Left voicemail message requesting a call back to [phone] between 9 a.m. and 5:30 p.m. for patient's ED/UC lab results.      Brinda Crowell RN  [facility] Service Center Result RN  [facility] Emergency Dept Lab Result RN  Ph# [phone]    "

## 2023-07-17 NOTE — TELEPHONE ENCOUNTER
Return call.    Adrianne notified of urine culture result  She has completed the antibiotic   Her sx's have resultved.    No new recommendatoins    Juan Chinchilla RN  [employer] [facility]  Emergency Dept Lab Result RN  Ph# [phone]

## 2023-11-26 ENCOUNTER — HEALTH MAINTENANCE LETTER (OUTPATIENT)
Age: 33
End: 2023-11-26

## 2025-01-04 ENCOUNTER — HEALTH MAINTENANCE LETTER (OUTPATIENT)
Age: 35
End: 2025-01-04